# Patient Record
Sex: FEMALE | Race: WHITE | Employment: UNEMPLOYED | URBAN - METROPOLITAN AREA
[De-identification: names, ages, dates, MRNs, and addresses within clinical notes are randomized per-mention and may not be internally consistent; named-entity substitution may affect disease eponyms.]

---

## 2017-11-19 ENCOUNTER — HOSPITAL ENCOUNTER (EMERGENCY)
Age: 57
Discharge: HOME | End: 2017-11-19
Attending: EMERGENCY MEDICINE
Payer: COMMERCIAL

## 2017-11-19 ENCOUNTER — APPOINTMENT (OUTPATIENT)
Dept: GENERAL RADIOLOGY | Age: 57
End: 2017-11-19
Payer: COMMERCIAL

## 2017-11-19 VITALS
SYSTOLIC BLOOD PRESSURE: 138 MMHG | HEART RATE: 84 BPM | DIASTOLIC BLOOD PRESSURE: 82 MMHG | TEMPERATURE: 97.8 F | WEIGHT: 160 LBS | OXYGEN SATURATION: 98 % | BODY MASS INDEX: 25.11 KG/M2 | HEIGHT: 67 IN | RESPIRATION RATE: 16 BRPM

## 2017-11-19 DIAGNOSIS — S60.221A CONTUSION OF RIGHT HAND, INITIAL ENCOUNTER: Primary | ICD-10-CM

## 2017-11-19 PROCEDURE — 73130 X-RAY EXAM OF HAND: CPT

## 2017-11-19 PROCEDURE — 99283 EMERGENCY DEPT VISIT LOW MDM: CPT

## 2017-11-19 RX ORDER — IBUPROFEN 600 MG/1
600 TABLET ORAL ONCE
Status: DISCONTINUED | OUTPATIENT
Start: 2017-11-19 | End: 2017-11-19 | Stop reason: HOSPADM

## 2017-11-19 ASSESSMENT — PAIN SCALES - GENERAL: PAINLEVEL_OUTOF10: 2

## 2017-11-19 ASSESSMENT — ENCOUNTER SYMPTOMS
TROUBLE SWALLOWING: 0
NAUSEA: 0
COUGH: 0
VOMITING: 0
SHORTNESS OF BREATH: 0

## 2017-11-20 NOTE — ED PROVIDER NOTES
[facility] [address] [facility] ED  eMERGENCY dEPARTMENT eNCOUnter      Pt Name: Sarah Gallardo  MRN: [id number]  [place] 1960  Date of evaluation: 11/19/2017  Provider: Rosana Tenorio [id number]       Chief Complaint   Patient presents with    Fall    Hand Pain         HISTORY OF PRESENT ILLNESS  (Location/Symptom, Timing/Onset, Context/Setting, Quality, Duration, Modifying Factors, Severity.)   Sarah Gallardo is a 62 y.o. female who presents to the emergency department With complaints of right hand injury. Patient states that she tripped over the curb and fell outside. Patient injured her right hand. She also reports that she bumped her right knee but it is not bothering her and doesn't really wanted checked out. Patient also has a bump on her lip. Denies loss of consciousness. Denies headache, dizziness, numbness, tingling or weakness. Denies neck or back pain. Reports pain in right hand, dorsal aspect, worse over the second and third MCP. States it hurts to move it or make a fist.  Doesn't take blood thinners. Right hand dominant. Nursing Notes were reviewed and I agree. REVIEW OF SYSTEMS    (2-9 systems for level 4, 10 or more for level 5)     Review of Systems   Constitutional: Negative for chills and fever. HENT: Negative for trouble swallowing. Respiratory: Negative for cough and shortness of breath. Cardiovascular: Negative for chest pain and palpitations. Gastrointestinal: Negative for nausea and vomiting. Musculoskeletal:        Right hand injury. Except as noted above the remainder of the review of systems was reviewed and negative. PAST MEDICAL HISTORY   History reviewed. No pertinent past medical history. Reviewed. SURGICAL HISTORY           Procedure Laterality Date    TONSILLECTOMY       Reviewed.   CURRENT MEDICATIONS       Previous Medications    No medications on file       ALLERGIES     Review of patient's allergies indicates no known allergies. FAMILY HISTORY     History reviewed. No pertinent family history. No family status information on file. Reviewed and not relevant. SOCIAL HISTORY      reports that she has never smoked. She has never used smokeless tobacco. She reports that she does not drink alcohol or use drugs. Reviewed. PHYSICAL EXAM    (up to 7 for level 4, 8 or more for level 5)     ED Triage Vitals   BP Temp Temp src Pulse Resp SpO2 Height Weight   -- -- -- -- -- -- -- --       Physical Exam   Constitutional: She is oriented to person, place, and time. She appears well-developed and well-nourished. HENT:   Head: Normocephalic and atraumatic. Right Ear: External ear normal.   Left Ear: External ear normal.   Nose: Nose normal.   Mouth/Throat: Oropharynx is clear and moist.   Eyes: Right eye exhibits no discharge. Left eye exhibits no discharge. No scleral icterus. Neck: Normal range of motion. Cardiovascular: Normal rate, regular rhythm and normal heart sounds. Pulmonary/Chest: Effort normal and breath sounds normal. No stridor. No respiratory distress. She has no wheezes. She has no rales. Musculoskeletal: Normal range of motion. She exhibits no edema. Cervical back: Normal.        Thoracic back: Normal.        Lumbar back: Normal.        Right hand: She exhibits tenderness and swelling. Hands:  Jeremy. Radial pulse +2. Sensation intact. Cap refill less than 2 seconds. Tenderness to palpation of dorsal aspect of second and third MCP. Mild swelling present. Pain with range of motion. No abrasion or.prlacerationrepair     Neurological: She is alert and oriented to person, place, and time. Coordination normal.   Skin: Skin is warm and dry. She is not diaphoretic. Psychiatric: She has a normal mood and affect.  Her behavior is normal.       DIAGNOSTIC RESULTS     RADIOLOGY:   [] Visualized by me  And Justin Larsen MD   Xr Hand Right (min 3 Views)    Result Date: Mirian Villa, [facility], CNP  11/19/17 2352